# Patient Record
Sex: MALE | Race: WHITE
[De-identification: names, ages, dates, MRNs, and addresses within clinical notes are randomized per-mention and may not be internally consistent; named-entity substitution may affect disease eponyms.]

---

## 2017-01-14 ENCOUNTER — HOSPITAL ENCOUNTER (EMERGENCY)
Dept: HOSPITAL 80 - CED | Age: 37
Discharge: HOME | End: 2017-01-14
Payer: COMMERCIAL

## 2017-01-14 VITALS — TEMPERATURE: 98.2 F | DIASTOLIC BLOOD PRESSURE: 67 MMHG | SYSTOLIC BLOOD PRESSURE: 113 MMHG | HEART RATE: 75 BPM

## 2017-01-14 VITALS — OXYGEN SATURATION: 96 % | RESPIRATION RATE: 18 BRPM

## 2017-01-14 DIAGNOSIS — M54.12: Primary | ICD-10-CM

## 2017-01-14 NOTE — UCPHY
H & P


Time Seen by Provider: 17 10:45


Patient Type: New


HPI/ROS: 


HPI


Neck pain, fingers numb.





36-year-old male by private vehicle with his wife.  Patient reports that 3 days 

ago he woke up with atraumatic left-sided neck pain.  He reports that this has 

gradually been worsening over the last 3 days.  It is aggravated by turning his 

head.  He reports that for the last 24-48 hours he has had pain and numbness in 

his left hand and specifically the 4th and 5th digits.





ROS:





Constitutional:  No fever, no chills.  No weakness.


Musculoskeletal:  No back pain.  As above.


Skin:  No rashes. No lacerations or abrasions.


Neurological:  No headache. As above.





Past medical history:  Denies any significant past medical history.





Social history:  Here with his wife.





Physical Exam:





General Appearance:  Alert, no distress.  This patient is responding to 

questions appropriately and in full sentences.  This patient appears well-

hydrated and well-nourished.


Neurological:  Motor sensory function is grossly intact except for paresthesia 

left upper extremity ulnar nerve distribution.  Axillary nerve distribution is 

intact.  Cranial nerves are normal.  Gait is normal.


Skin:  Warm and dry, no rashes.


Musculoskeletal:  Neck is supple with paracervical tenderness on palpation on 

the left side from the mid trapezius up through C5.  He does not have 

significant midline cervical spine tenderness. No edema, erythema, warmth, 

ecchymosis on inspection of the soft tissues of his cervical spine and left 

trapezius.


Extremities are symmetrical.  All joints range without pain or impingement.


Psychiatric:  No agitation.  No depression.





Database:





EKG:





Imaging:





Procedures:





Emergency department course:





11:00 a.m., after my evaluation, I discussed the need for MRI of the cervical 

spine.  He will be sent to the [facility] Emergency Department to have this 

study done.  He was given 600 mg of ibuprofen and 2 [place] in the urgent care.  

His wife will be driving.  I spoke with physician assistant Dang Nevarez who 

is aware the patient is coming to the emergency department at [facility] and 

will follow up on the results of his cervical spine MRI and disposition 

appropriately.  The patient was discharged for transfer to the [facility] 

Emergency Department in good condition.











Differential Diagnosis:





The differential diagnosis on this patient includes but is not limited to 

cervical radiculopathy, bulging intervertebral cervical disc.  Acute fracture, 

subluxation, dislocation of the cervical spine unlikely This represents a 

partial list of diagnoses considered.  These considerations are based on history

, physical exam, past history, reassessment and diagnostic testing.


Smoking Status: Never smoked


Constitutional: 





 Initial Vital Signs











Temperature (C)  36.7 C   17 10:07


 


Heart Rate  74   17 10:07


 


Respiratory Rate  16   17 10:07


 


Blood Pressure  117/86 H  17 10:07


 


O2 Sat (%)  96   17 10:07








 











O2 Delivery Mode               Room Air














Allergies/Adverse Reactions: 


 





No Known Allergies Allergy (Unverified 17 10:07)


 








Home Medications: 














 Medication  Instructions  Recorded


 


Hydrochlorothiazide  16














MDM/Departure





- Depart


Disposition: [facility]


Clinical Impression: 


 Neck pain, Cervical radiculopathy at C7


Condition: Good


Instructions:  Neck Pain (ED), Cervical Radiculopathy (ED)


Additional Instructions: 


Read and follow provided instructions.





Go straight to the [facility] Emergency Department as discussed for MRI of your 

cervical spine.  Physician assistant Dang Nevarez and the staff there are 

aware your coming.





Ibuprofen dosin mg every 6 hours with meals for the next 3 days only.





Norco/Percocet dosin-2 every 4-6 hours for pain.  Do not drive on this 

medication.








Referrals: 


Lamont Pena MD [Primary Care Provider] - As per Instructions





- PQRS


PQRS Measurement: 


Not applicable.

## 2017-01-14 NOTE — MR
MRI Cervical Spine (Without Contrast)

 

History: Neck pain and left arm numbness in a 36-year-old male

 

Technique: Sagittal T1, T2 and STIR sequences as well as axial T2 and 3-D gradient echo MR sequences 
of the cervical spine are obtained without contrast.

 

Findings:    Cervical vertebral bodies are normal height, signal intensity and alignment without comp
ression fractures or spondylolisthesis.  Cerebellar tonsils are in normal position. Cervical spinal c
ord demonstrates normal signal without cord edema or myelomalacia.

 

C2-C3: Negative for disk herniation, canal stenosis or nerve root impingement.

 

C3-C4: Negative.

 

C4-C5: Negative.

 

C5-C6: Negative for disk herniation, canal stenosis or nerve root impingement. There is mild facet hy
pertrophy.

 

C6-C7: Disk degenerative changes and a mild diffuse disk bulge are seen there is no dony disk hernia
tion, canal stenosis or nerve root impingement. 

 

C7-T1: Negative.

 

Impression:

Minimal degenerative changes at C6-C7. No canal stenosis or nerve root impingement is identified.

 

A preliminary report was called to Dang Nevarez NP at 1350 hours in the Emergency Department.

## 2017-01-14 NOTE — EDPHY
H & P


Time Seen by Provider: 17 12:00


HPI/ROS: 


CHIEF COMPLAINT: neck pain





HISTORY OF PRESENT ILLNESS:  36-year-old male presents to the emergency 

department after being seen at the [facility] for left

-sided neck pain.  He has been referred here for an MRI.  Patient woke up left-

sided neck pain 3 days ago that has been worsening and aggravated with 

movement.  Patient complains of numbness and pain down his left arm and into 

his left hand including his 4th and 5th fingers.  Patient denies trauma, no 

fevers or chills, no recent illness.





REVIEW OF SYSTEMS:


A comprehensive 10 point review of systems is otherwise negative aside from 

elements mentioned in the history of present illness.


Source: Patient, Family


Exam Limitations: No limitations





- Medical/Surgical History


Hx Asthma: No


Hx Chronic Respiratory Disease: No


Hx Diabetes: No


Hx Cardiac Disease: No


Hx Renal Disease: No


Hx Cirrhosis: No


Hx Alcoholism: No


Hx HIV/AIDS: No


Hx Splenectomy or Spleen Trauma: No


Other PMH: denies





- Social History


Smoking Status: Never smoked





- Physical Exam


Exam: 


Physical Exam


Gen: Alert and Oriented, NAD 


HEENT: PERRL, moist mucous membranes 


NECK: no meningismus, paracervical tenderness on palpation to left side with 

tenderness to trapezius, no midline tenderness, no swelling or ecchymosis


NEURO:  Neurologically grossly intact, paresthesias to left upper extremity to 

triceps down to 4th and 5th digits


EXTREMITIES: normal appearing


SKIN: no rash or break in skin on exposed skin


PSYCH: answers questions appropriately.





Constitutional: 


 Initial Vital Signs











Temperature (C)  36.7 C   17 10:07


 


Heart Rate  74   17 10:07


 


Respiratory Rate  16   17 10:07


 


Blood Pressure  117/86 H  17 10:07


 


O2 Sat (%)  96   17 10:07








 











O2 Delivery Mode               Room Air














Allergies/Adverse Reactions: 


 





No Known Allergies Allergy (Unverified 17 10:07)


 








Home Medications: 














 Medication  Instructions  Recorded


 


Hydrochlorothiazide  16


 


Hydrocodone/APAP 5/325 [Norco 1 tab PO Q4H PRN #14 tab 17





5/325]  


 


Methocarbamol [Robaxin-750] 750 - 1,500 mg PO QID PRN #20 17





 tablet 














Medical Decision Making





- Diagnostics


Imaging: 


MRI cervical spine-


Impression: 


 Minimal degenerative changes at C6-C7. No canal stenosis or nerve root 

impingement is identified. 


 


 A preliminary report was called to Dang Nevarez NP at 1350 hours in the 

Emergency Department. 


 


               Dictated By: Munir Whitehead MD 





ED Course/Re-evaluation: 


36-year-old male presents with a 3 day history of atraumatic left-sided neck 

pain with paresthesias down left arm.  No evidence of infection.  No evidence 

of Claudio's angina.  MRI obtained showing a mild central disc bulge at C6 and 

7.  Patient is discharged with a prescription for Norco and Robaxin.  He is 

given a neurosurgeon for follow-up.  I have recommended ice or heat whichever 

feels better, 600 mg of ibuprofen every 8 hours with food for 5 days, gentle 

massage.  Patient is to return for worsening symptoms, fevers, other questions 

or concerns.


Differential Diagnosis: 


Diagnosis considered but not limited to torticollis, cervical strain, cervical 

radiculopathy, muscle spasm





- Data Points


Medications Given: 


 








Discontinued Medications





Acetaminophen/Hydrocodone Bitart (Norco 5/325)  2 tab PO EDNOW ONE


   Stop: 17 10:55


   Last Admin: 17 11:00 Dose:  2 tab








Departure





- Departure


Disposition: Home, Routine, Self-Care


Clinical Impression: 


 Neck pain, Cervical radiculopathy at C7


Condition: Good


Instructions:  Cervical Radiculopathy (ED), Neck Pain (ED)


Additional Instructions: 


Follow-up with your primary care doctor or the neurosurgeon listed at 1st 

available appointment.  Return to the emergency department for any new symptoms 

or concerns.





Ibuprofen dosin mg every 6 hours with meals for the next 3 days only.





Norco/Percocet dosin-2 every 4-6 hours for pain.  Do not drive on this 

medication.








Referrals: 


Lamont Pena MD [Primary Care Provider] - As per Instructions


Alexsandra Thompson DO [Doctor of Osteopathy] - As per Instructions (

Neurosurgeon on-call)


Stand Alone Forms:  Work Excuse


Prescriptions: 


Hydrocodone/APAP 5/325 [Norco 5/325] 1 tab PO Q4H PRN #14 tab


 PRN Reason: Pain, Moderate


Methocarbamol [Robaxin-750] 750 - 1,500 mg PO QID PRN #20 tablet


 PRN Reason: Spasms

## 2017-03-15 ENCOUNTER — HOSPITAL ENCOUNTER (OUTPATIENT)
Dept: HOSPITAL 80 - CLAB | Age: 37
End: 2017-03-15
Attending: FAMILY MEDICINE
Payer: COMMERCIAL

## 2017-03-15 DIAGNOSIS — S92.514A: Primary | ICD-10-CM

## 2019-07-01 ENCOUNTER — HOSPITAL ENCOUNTER (EMERGENCY)
Dept: HOSPITAL 80 - CED | Age: 39
Discharge: HOME | End: 2019-07-01
Payer: COMMERCIAL